# Patient Record
Sex: FEMALE | URBAN - METROPOLITAN AREA
[De-identification: names, ages, dates, MRNs, and addresses within clinical notes are randomized per-mention and may not be internally consistent; named-entity substitution may affect disease eponyms.]

---

## 2021-11-17 ENCOUNTER — OFFICE VISIT (OUTPATIENT)
Dept: URBAN - METROPOLITAN AREA CLINIC 40 | Facility: CLINIC | Age: 66
End: 2021-11-17

## 2021-12-13 ENCOUNTER — OFFICE VISIT (OUTPATIENT)
Dept: URBAN - METROPOLITAN AREA CLINIC 40 | Facility: CLINIC | Age: 66
End: 2021-12-13
Payer: MEDICARE

## 2021-12-13 ENCOUNTER — WEB ENCOUNTER (OUTPATIENT)
Dept: URBAN - METROPOLITAN AREA CLINIC 40 | Facility: CLINIC | Age: 66
End: 2021-12-13

## 2021-12-13 ENCOUNTER — DASHBOARD ENCOUNTERS (OUTPATIENT)
Age: 66
End: 2021-12-13

## 2021-12-13 VITALS
DIASTOLIC BLOOD PRESSURE: 90 MMHG | SYSTOLIC BLOOD PRESSURE: 160 MMHG | HEART RATE: 88 BPM | TEMPERATURE: 97.9 F | WEIGHT: 119 LBS

## 2021-12-13 DIAGNOSIS — Z90.49 HISTORY OF CHOLECYSTECTOMY: ICD-10-CM

## 2021-12-13 DIAGNOSIS — K52.9 CHRONIC DIARRHEA: ICD-10-CM

## 2021-12-13 DIAGNOSIS — Z85.3 HISTORY OF BREAST CANCER: ICD-10-CM

## 2021-12-13 DIAGNOSIS — R10.9 ABDOMINAL CRAMPING: ICD-10-CM

## 2021-12-13 DIAGNOSIS — K59.01 CONSTIPATION: ICD-10-CM

## 2021-12-13 PROBLEM — 14760008 CONSTIPATION: Status: ACTIVE | Noted: 2021-12-13

## 2021-12-13 PROBLEM — 415076002 PERSONAL HISTORY OF PRIMARY MALIGNANT NEOPLASM OF BREAST: Status: ACTIVE | Noted: 2021-12-13

## 2021-12-13 PROBLEM — 428882003 HISTORY OF CHOLECYSTECTOMY: Status: ACTIVE | Noted: 2021-12-13

## 2021-12-13 PROCEDURE — 99203 OFFICE O/P NEW LOW 30 MIN: CPT | Performed by: PHYSICIAN ASSISTANT

## 2021-12-13 RX ORDER — COLESTIPOL HYDROCHLORIDE 1 G/1
1 TABLET TABLET, FILM COATED ORAL TID
Qty: 90 TABLET | Refills: 1 | OUTPATIENT
Start: 2021-12-13

## 2021-12-13 NOTE — HPI-TODAY'S VISIT:
Ms. Ivory is a pleasant 66-year-old White female presents to the office today with multiple GI complaints.  She admits that for the least 20 years, she has suffered from constipation but three years ago in 2018, she was diagnosed with breast cancer and has undergone chemo and radiation.  Continues chemotherapy daily for the next 5 years total, she remarks.  Also earlier this year, she was diagnosed with pneumonia and states that this was a result of inhalation of Clorox.  Her  has  in the last year as well as her son and admits that she was cleaning around the house with a lot of Clorox products which she inhaled.  She has been prescribed oxygen by her pulmonologist Dr. Dubois.  However, admits that her blood pressure is running high when she runs out of her oxygen.  Denies any changes in vision, severe headache or chest pain, shortness of breath at this time.  Admits that when she has diarrhea, she oftentimes may have some vomiting without hematemesis.  Has not been evaluated by GI in the past outside of colonoscopy which was reportedly normal back in .  Around that time, she also admits to using digital manipulation through the vagina to have a bowel movement.  No significant rectal bleeding at this time or melena.  Does not use NSAIDs regularly.  Takes lisinopril for blood pressure as well as chemotherapy agent, not listed.  History of cholecystectomy remotely.  Seems to have good response with twice daily Imodium most days.  Occasionally she will have to take more pills to control the loose stool.  Antibiotics last in the last two months for the pneumonia.  Prior to that, no use of antibiotics.  During the day, she is eating a bland diet and drinking a lot of Gatorade.  Drinks some water as well.  No dysphagia reported.  She is thin.  No significant weight loss recently.  She has had difficulty obtaining oxygen through her Medicare, she reports.  We will see her pulmonologist tomorrow.